# Patient Record
(demographics unavailable — no encounter records)

---

## 2024-11-18 NOTE — REASON FOR VISIT
[Patient preference] : as per patient preference [Continuing, patient seen in-person within last 12 months] : Telehealth services are continuing as patient has been seen in-person within last 12 months. [Telehealth (audio & video) - Individual/Group] : This visit was provided via telehealth using real-time 2-way audio visual technology. [Medical Office: ([facility])___] : The provider was located at the medical office in [unfilled]. [Home] : The patient, [unfilled], was located at home, [unfilled], at the time of the visit. [Participant(s) identity verified] : Participant(s) identity verified. [Verbal consent obtained from patient/other participant(s)] : Verbal consent for telehealth/telephonic services obtained from patient/other participant(s) [Patient] : Patient [FreeTextEntry4] : 11am

## 2024-11-18 NOTE — PLAN
[FreeTextEntry5] : Pt was previously seeing NP Brenda Mckinley since 4/21/2022, then transferred to Dr. Carlson 10/27/2022, now is transferring to writer. 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  On initial assessment, the differential includes MDD, anxiety, MJ use, r/o PTSD, r/o MJ induced mood disorder. Plan to continue Lexapro 30mg daily, Buspar 20mg twice a day, Klonopin 0.5mg as needed (taking 1-2 times a month lately) since pt has been stable on this regimen lately. Pt expressed interest in starting CBT, writer offered pt the [facility] CBT practice phone number [phone] to call to setup an intake appt.  On follow up assessments: 11/18/2024: Discussed that Lexapro 20mg is the FDA approved limit for Lexapro. Anxiety is under control. Plan to continue Lexapro 30mg daily, Buspar 20mg twice a day, Klonopin 0.5mg as needed for anxiety (has not needed during the interval).  Labs: 12/15/2023: CBC wnl, CMP wnl, A1c 5.3% wnl, TSH 1.58 wnl, vit B12 392 wnl, chol 195 wnl, trig 131 wnl, HDL 62 wnl, LDL calc 110 H, vit D 25 hydroxy 51.6 wnl  PLAN -Discussed the diagnosis, treatment, alternatives to recommended treatment, risk Vs benefits of treatment and no treatment and alternative treatments. -Lab/other tests: advised to continue to check annual bloodwork with PCP. -Medication: continue Lexapro 30mg daily, Buspar 20mg twice a day, Klonopin 0.5mg as needed  SSRI side effects including but not limited to GI side effects, headaches, dizziness, serotonin syndrome, hyponatremia, QT prolongation, weight gain, decreased libido, and black box warning of SI in patients younger than 24 were discussed.  Buspirone side effects were discussed including but not limited to dizziness, headaches, nervousness, sedation, excitement, nausea, restlessness, rare cardiac symptoms. Benzodiazepine side effects: Discussed with patient adverse effects of longer term/frequent use of BZD, potential to develop tolerance to the dose effects and develop dependence, and potential for addiction. Advise patient not to drive or operate heavy machinery immediately after taking the medication. Also educated patient about safe use/and keeping meds safely, and to not share medications with family/friends. Advised not to combine BZD with EtOH. Discussed potential side effects such as sedation, fatigue, depression, dizziness, ataxia, slurred speech, weakness, forgetfulness, confusion, hyperexcitability, nervousness, rare hallucinations, rare thony, rare hypotension, hypersalivation, dry mouth, respiratory depression, rare hepatic dysfunction or blood dyscrasias, grand mal seizures. -Discussed recommendation for aerobic exercise -Discussed sleep hygiene -Educated patient of importance of remaining abstinent from drugs and alcohol. -Emergency procedures were discussed: pt. educated to call 911 or go to nearest ER for worsening of symptoms/suicidal/homicidal ideation. -Pt expressed interest in starting CBT, writer offered pt the [facility] CBT practice phone number [phone] to call to setup an intake appt. -Return to clinic in Feb 2025 or earlier as needed. Discussed that writer will be out of office on a leave starting 12/5/2024 and returning 2/13/2025, and advised pt that pt can contact writer's  for covering providers during this period as needed. -Patient given opportunity to ask questions and their questions were answered and they expressed understanding and agreement with above plan.  Reference #: [id number]  Practitioner Count: 1 Pharmacy Count: 1 Current Opioid Prescriptions: 0 Current Benzodiazepine Prescriptions: 0 Current Stimulant Prescriptions: 0   Patient Demographic Information (PDI)     PDI	First Name	Last Name	Birth Date	Gender	Street Address	[facility]	Zip Code [place]	04/05/1992	Female	[address] [place]	[id number]  Prescription Information    PDI Filter:   PDI	My Rx	Current Rx	Drug Type	Rx Written	Rx Dispensed	Drug	Quantity	Days Supply	Prescriber Name	Prescriber NAVDEEP #	Payment Method	Dispenser A	Y	N	B	09/09/2024	09/12/2024	clonazepam 0.5 mg tablet	30	30	Bucky Ellis	[id number]	Insurance	[facility] #[id number]

## 2024-11-18 NOTE — HISTORY OF PRESENT ILLNESS
[FreeTextEntry1] : Social: Has been doing [employer] workouts. Getting next October 2025. Meditating. ISTOP reviewed.  Medications: Lexapro 30mg: endorses adherence, denies side effects Buspar 20mg twice a day:  endorses adherence, denies side effects Klonopin 0.5mg as needed: has not taken during the interval  Mood: "good" Anxiety: under control Panic attacks: denies Motivation: good Anhedonia: denies Appetite: good Sleep: good Energy: good Focus: intact Guilt/worthlessness: none elicited Thoughts of death: none elicited Thoughts of harming self: none elicited Thoughts of harming others: none elicited  EtOH use: socially, on weekends Drug use: weekend MJ use Tobacco use: denies Caffeine use: 1/2 a cup of coffee in the AM.

## 2024-11-18 NOTE — PHYSICAL EXAM
[None] : none [Cooperative] : cooperative [Euthymic] : euthymic [Full] : full [Clear] : clear [Linear/Goal Directed] : linear/goal directed [Average] : average [WNL] : within normal limits [FreeTextEntry8] : "good" [de-identified] : euthymic, mildly anxious, stable [FreeTextEntry7] : denies suicidal ideation/intent/plan or non-suicidal self-injurious ideation/intent/plan or homicidal ideation/intent/plan

## 2024-11-18 NOTE — PHYSICAL EXAM
[None] : none [Cooperative] : cooperative [Euthymic] : euthymic [Full] : full [Clear] : clear [Linear/Goal Directed] : linear/goal directed [Average] : average [WNL] : within normal limits [FreeTextEntry8] : "good" [de-identified] : euthymic, mildly anxious, stable [FreeTextEntry7] : denies suicidal ideation/intent/plan or non-suicidal self-injurious ideation/intent/plan or homicidal ideation/intent/plan

## 2024-11-18 NOTE — REASON FOR VISIT
[Patient preference] : as per patient preference [Continuing, patient seen in-person within last 12 months] : Telehealth services are continuing as patient has been seen in-person within last 12 months. [Telehealth (audio & video) - Individual/Group] : This visit was provided via telehealth using real-time 2-way audio visual technology. [Medical Office: ([facility])___] : The provider was located at the medical office in [unfilled]. [Home] : The patient, [unfilled], was located at home, [unfilled], at the time of the visit. [Participant(s) identity verified] : Participant(s) identity verified. [Verbal consent obtained from patient/other participant(s)] : Verbal consent for telehealth/telephonic services obtained from patient/other participant(s) [Patient] : Patient [FreeTextEntry4] : 11am

## 2024-11-18 NOTE — PLAN
[FreeTextEntry5] : Pt was previously seeing NP Brenda Mckinley since 4/21/2022, then transferred to Dr. Carlson 10/27/2022, now is transferring to writer. 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  On initial assessment, the differential includes MDD, anxiety, MJ use, r/o PTSD, r/o MJ induced mood disorder. Plan to continue Lexapro 30mg daily, Buspar 20mg twice a day, Klonopin 0.5mg as needed (taking 1-2 times a month lately) since pt has been stable on this regimen lately. Pt expressed interest in starting CBT, writer offered pt the [facility] CBT practice phone number [phone] to call to setup an intake appt.  On follow up assessments: 11/18/2024: Discussed that Lexapro 20mg is the FDA approved limit for Lexapro. Anxiety is under control. Plan to continue Lexapro 30mg daily, Buspar 20mg twice a day, Klonopin 0.5mg as needed for anxiety (has not needed during the interval).  Labs: 12/15/2023: CBC wnl, CMP wnl, A1c 5.3% wnl, TSH 1.58 wnl, vit B12 392 wnl, chol 195 wnl, trig 131 wnl, HDL 62 wnl, LDL calc 110 H, vit D 25 hydroxy 51.6 wnl  PLAN -Discussed the diagnosis, treatment, alternatives to recommended treatment, risk Vs benefits of treatment and no treatment and alternative treatments. -Lab/other tests: advised to continue to check annual bloodwork with PCP. -Medication: continue Lexapro 30mg daily, Buspar 20mg twice a day, Klonopin 0.5mg as needed  SSRI side effects including but not limited to GI side effects, headaches, dizziness, serotonin syndrome, hyponatremia, QT prolongation, weight gain, decreased libido, and black box warning of SI in patients younger than 24 were discussed.  Buspirone side effects were discussed including but not limited to dizziness, headaches, nervousness, sedation, excitement, nausea, restlessness, rare cardiac symptoms. Benzodiazepine side effects: Discussed with patient adverse effects of longer term/frequent use of BZD, potential to develop tolerance to the dose effects and develop dependence, and potential for addiction. Advise patient not to drive or operate heavy machinery immediately after taking the medication. Also educated patient about safe use/and keeping meds safely, and to not share medications with family/friends. Advised not to combine BZD with EtOH. Discussed potential side effects such as sedation, fatigue, depression, dizziness, ataxia, slurred speech, weakness, forgetfulness, confusion, hyperexcitability, nervousness, rare hallucinations, rare thony, rare hypotension, hypersalivation, dry mouth, respiratory depression, rare hepatic dysfunction or blood dyscrasias, grand mal seizures. -Discussed recommendation for aerobic exercise -Discussed sleep hygiene -Educated patient of importance of remaining abstinent from drugs and alcohol. -Emergency procedures were discussed: pt. educated to call 911 or go to nearest ER for worsening of symptoms/suicidal/homicidal ideation. -Pt expressed interest in starting CBT, writer offered pt the [facility] CBT practice phone number [phone] to call to setup an intake appt. -Return to clinic in Feb 2025 or earlier as needed. Discussed that writer will be out of office on a leave starting 12/5/2024 and returning 2/13/2025, and advised pt that pt can contact writer's  for covering providers during this period as needed. -Patient given opportunity to ask questions and their questions were answered and they expressed understanding and agreement with above plan.  Reference #: [id number]  Practitioner Count: 1 Pharmacy Count: 1 Current Opioid Prescriptions: 0 Current Benzodiazepine Prescriptions: 0 Current Stimulant Prescriptions: 0   Patient Demographic Information (PDI)     PDI	First Name	Last Name	Birth Date	Gender	Street Address	[facility]	Zip Code [place]	04/05/1992	Female	[address] [place]	[id number]  Prescription Information    PDI Filter:   PDI	My Rx	Current Rx	Drug Type	Rx Written	Rx Dispensed	Drug	Quantity	Days Supply	Prescriber Name	Prescriber NAVDEEP #	Payment Method	Dispenser A	Y	N	B	09/09/2024	09/12/2024	clonazepam 0.5 mg tablet	30	30	Bucky Ellis	[id number]	Insurance	[facility] #[id number]

## 2024-12-02 NOTE — PHYSICAL EXAM
[FreeTextEntry3] : General: well appearing person in nad, alert, pleasant Focused Skin Exam per patient preference: Eyelids, A/c fossa, and dorsal hands with eczematous thin plaques

## 2024-12-02 NOTE — ASSESSMENT
[FreeTextEntry1] : #Atopic dermatitis/eczematous derm, eyelids, arms, hands chronic, flare - Education and counseling - Gentle skin care reviewed. Emphasized to use gentle, fragrance-free personal care products (including soap and laundry detergent); Avoid scrubbing/rubbing skin - Start mometasone ointment BID to affected areas on hands/arms for 2 weeks on/1 week off. Proper medication use and side effects discussed, including cutaneous atrophy and striae. Avoid long-term use; do not use on face, axillae, groin. - start tacrolimus 0.1% to AA BID, SED including burning, which usually subsides after initial use (within 5 days, apply vaseline first), avoid applying to any open areas. - Madison use of bland emollients.   RTC 3 months or sooner prn  This visit was conducted via live synchronous audio/video telehealth with the patient and provider. The patient attested to being located in [facility] where the provider is also currently located and licensed to practice medicine. Verbal consent was obtained for this telemedicine encounter. Risks and benefits of using Telehealth services has been discussed with the patient. The patient has been given ample opportunity to discuss any questions regarding [facility]'s Telehealth services. All of the patient's questions were answered to his/her satisfaction.

## 2024-12-02 NOTE — HISTORY OF PRESENT ILLNESS
[FreeTextEntry1] : NPV- eczema [de-identified] : Dec  2 2024  1:15PM   32 year F new patient here for evaluation of eczema on eyelids, arms, hands. Has had eczema for many years, improved in adulthood. Usually controlled by otc HC. Flares in fall/weather change. Rrecently got a medrol dose pack which cleared everything but then it flared. Now using dove soap and LRP cicaplast.   All: NKDA No personal or family hx of skin cancer

## 2025-03-03 NOTE — HISTORY OF PRESENT ILLNESS
[FreeTextEntry1] : This is a case of a 32-year-old right-handed female with a past medical history of hypertension, anxiety, IBS who had been under the care of Dr. Lambert.  Patient reports having headaches since the onset of menses (puberty) notes, by diary, headaches triggered by drinking alcohol 1 cocktail per night and dehydration.  She also notes grinding her teeth and is receiving Botox for her masseter muscle spasms.  Currently, she drinks 1 cocktail on the weekend and is doing better.  She had taken Ubrelvy 1 dose per 24 hours with good relief but the headache returned.  Otherwise, she has not been on any preventative or other abortive agent.  Headaches are described as a bilateral throbbing sensation associated with tenderness of the scalp worsened by massage.  There is associated nausea with occasional vomiting, positive for photophobia but negative for phonophobia and aura.  She denies cognitive phobia.  She has had an MRI of the brain which was reported to be unremarkable.  Social history she is planning to get  in October 2025.  She is looking to get pregnant soon after the marriage. Denies tobacco use.  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 Allergies: None known by patient.  Family history positive for migraines.

## 2025-03-03 NOTE — PHYSICAL EXAM
[FreeTextEntry1] : Constitutional: No signs of distress or signs of toxicity. Mental Status: Alert and well oriented. Speech fluent. No aphasia. Fund of knowledge intact. Psychiatric: Mood stable Cranial Nerve: PERRLA: No papilledema; No VFC; EOM full. no nystagmus. No Silver. V1-3 intact. No facial asymmetry, hearing grossly intact; palate elevates symmetrically, tongue midline Motor: No involuntary movements noted.  Adequate bulk, tone throughout, 5/5 strength of all muscle groups DTR: present and symmetrical; no clonus, plantars  downgoing Sensory: intact to primary and secondary modalities; neg Romberg Cerebellar: adequate finger to nose and heel to shin bilaterally. Gait: non antalgic or ataxic. Eyes: no redness or swelling HEENT: intact; no signs of trauma. Neck: No masses noted Pulmonary: no respiratory distress Vascular: no temperature, color change or sudomotor changes.; no edema Musculoskeletal: examination of the cervical spine reveals no midline tenderness, range of motion full upon flexion, extension and lateral rotation.  Negative TMJ tenderness or crepitation bilaterally. Skin: No rash.

## 2025-05-05 NOTE — PHYSICAL EXAM
[de-identified] : Right foot Physical Examination:   General: Alert and oriented x3.  In no acute distress.  Pleasant in nature with a normal affect.  No apparent respiratory distress. Erythema, Warmth, Rubor: Negative Swelling: Negative  +3/4 cm to 1 cm transverse laceration of 1st metatarsal. +tendon extension   ROM Ankle: 1. Dorsiflexion: 10 degrees 2. Plantarflexion: 40 degrees 3. Inversion: 20 degrees 4. Eversion: 20 degrees   ROM of digits: Normal Pes Planus: Negative Pes Cavus: Negative   Bunion: Negative Tailor's Bunion (Bunionette): Negative Hammer Toe Deformity/Deformities: Negative   Tenderness to Palpation: 1. Heel Pain: Negative 2. Midfoot Pain: Negative 3. First MTP Joint: Negative 4. Lis Franc Joint: Negative 5. Lateral Malleolus: Negative 6. Medial Malleolus: Negative   Tenderness Metatarsals: 1st MT: + 2nd MT: Negative 3rd MT: Negative 4th MT: Negative 5th MT: Negative Base of the 5th MT: Negative   Tendon Pain: 1. Posterior Tibialis: Negative 2. Peroneus Brevis/Longus: Negative   Ligament Pain: 1. Lis Franc Ligament: Negative 2. Plantar Fascia Ligament: Negative 3. ATFL/CFL/PTFL: Negative 4. Deltoid Ligaments: Negative   Stability: 1. Anterior Drawer: Negative 2. Posterior Drawer: Negative   Strength: 5/5 TA/GS/EHL/FHL/EDL/ADD/ABD   Pulses: 2+ DP/PT Pulses   Capillary Refill Toes: <2 seconds   Neuro: Intact motor and sensory throughout   Additional Test: 1. Ross's Squeeze Test: Negative 2. Calcaneal Squeeze Test: Negative 3. Alvarado Test: Negative 4. Tarsal Tunnel Tinel's Sign (Posterior Tibial Nerve Impingement): Negative 5. Single Heel Rise: Negative  [de-identified] : Radiology imaging reviewed in office with the patient on 05/05/2025 and I agree with the radiologist's impression below.  Procedure was performed at the [facility]  EXAM: FOOT MIN 3 VWS RIGHT   PROCEDURE DATE: 05/02/2025   INTERPRETATION: XR FOOT COMPLETE 3 VIEWS RIGHT  HISTORY: Pain. Injury. Concern for 1st metatarsal head fracture..  VIEWS: 3 IMAGES: 3  COMPARISON: None.  FINDINGS:  OSSEOUS STRUCTURES Fractures: Nondisplaced transverse fracture of the 1st metatarsal neck.  JOINTS Joint Space(s): Maintained.  OTHER FINDINGS: N/A  IMPRESSION: 1. Nondisplaced 1st metatarsal neck fracture.  --- End of Report ---       JESSIKA ABRAHAM MD; Attending Radiologist This document has been electronically signed. May 2 2025 12:00PM

## 2025-05-05 NOTE — ADDENDUM
[FreeTextEntry1] : I, Brandt Jose, acted solely as a scribe for Dr. Dash Marmolejo on this date 05/05/2025.   All medical record entries made by the Scribe were at my, Dr. Dash Marmolejo, direction and personally dictated by me on 05/05/2025. I have reviewed the chart and agree that the record accurately reflects my personal performance of the history, physical exam, assessment and plan. I have also personally directed, reviewed, and agreed with the chart.

## 2025-05-05 NOTE — DISCUSSION/SUMMARY
[de-identified] : Today I had a lengthy discussion with the patient regarding their right foot fracture pain. I have addressed all the patient's concerns surrounding the pathology of their condition. I have reviewed the patient's XR imaging with them in great detail.  I would like the patient to proceed with conservative management, which was discussed in great detail.  Assessment: Nondisplaced 1st metatarsal neck fracture and laceration    Plan:  1. I recommend that the patient utilize ice, NSAIDS/Tylenol PRN, and heat. They can also elevate their RLE above the level of the heart. 2. Activity modification was discussed in great detail. Remain NWB in the CAM boot and crutches. 3. I recommend that the patient take Keflex antibiotics after she finishes her doxycycline script. A prescription was provided in the office today. The patient was instructed on local wound care. I recommend that the patient keep her wound dry and open to air.   The patient understood and verbally agreed to the treatment plan. All of their questions were answered, and they were satisfied with the visit. The patient should call the office if they have any questions or experience worsening symptoms.  Closed fracture care  f/u in 2 weeks

## 2025-05-05 NOTE — HISTORY OF PRESENT ILLNESS
[FreeTextEntry1] : 05/05/2025: DANIEL LOWRY is a 33 year old female presenting to the office with her fiance for an initial evaluation of a right foot fracture sustained on 05/02/2025. She dropped a cement table on her foot and lacerated her toe. She was wearing only socks. Radiographs at [facility] were positive for a 1st metatarsal neck fracture. She was sutured and provided a CAM boot and crutches. She is on a 2 week course doxycycline ~1.5 weeks and was not prescribed antibiotics at [facility]. She was advised to follow-up with an orthopedist. She uses an iWalk and crutches. She has been washing her toe. 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 The patient presents to the office NWB wearing a CAM boot and ambulating with crutches.

## 2025-05-19 NOTE — ADDENDUM
[FreeTextEntry1] : I, Brandt Jose, acted solely as a scribe for Dr. Dash Marmolejo on this date 05/19/2025.   All medical record entries made by the Scribe were at my, Dr. Dash Marmolejo, direction and personally dictated by me on 05/19/2025. I have reviewed the chart and agree that the record accurately reflects my personal performance of the history, physical exam, assessment and plan. I have also personally directed, reviewed, and agreed with the chart.

## 2025-05-19 NOTE — PHYSICAL EXAM
[de-identified] : Right foot Physical Examination:   General: Alert and oriented x3.  In no acute distress.  Pleasant in nature with a normal affect.  No apparent respiratory distress. Erythema, Warmth, Rubor: Negative Swelling: Negative  +3/4 cm to 1 cm transverse laceration of 1st metatarsal. Healing scar +tendon extension +no signs of infection   ROM Ankle: 1. Dorsiflexion: 10 degrees 2. Plantarflexion: 40 degrees 3. Inversion: 20 degrees 4. Eversion: 20 degrees   ROM of digits: Normal Pes Planus: Negative Pes Cavus: Negative   Bunion: Negative Tailor's Bunion (Bunionette): Negative Hammer Toe Deformity/Deformities: Negative   Tenderness to Palpation: 1. Heel Pain: Negative 2. Midfoot Pain: Negative 3. First MTP Joint: Negative 4. Lis Franc Joint: Negative 5. Lateral Malleolus: Negative 6. Medial Malleolus: Negative   Tenderness Metatarsals: 1st MT: +, improved 2nd MT: Negative 3rd MT: Negative 4th MT: Negative 5th MT: Negative Base of the 5th MT: Negative   Tendon Pain: 1. Posterior Tibialis: Negative 2. Peroneus Brevis/Longus: Negative   Ligament Pain: 1. Lis Franc Ligament: Negative 2. Plantar Fascia Ligament: Negative 3. ATFL/CFL/PTFL: Negative 4. Deltoid Ligaments: Negative   Stability: 1. Anterior Drawer: Negative 2. Posterior Drawer: Negative   Strength: 5/5 TA/GS/EHL/FHL/EDL/ADD/ABD   Pulses: 2+ DP/PT Pulses   Capillary Refill Toes: <2 seconds   Neuro: Intact motor and sensory throughout   Additional Test: 1. Ross's Squeeze Test: Negative 2. Calcaneal Squeeze Test: Negative 3. Alvarado Test: Negative 4. Tarsal Tunnel Tinel's Sign (Posterior Tibial Nerve Impingement): Negative 5. Single Heel Rise: Negative  [de-identified] : 3V of the right foot were ordered, obtained and reviewed by me today, 05/19/2025, and revealed: Nondisplaced 1st metatarsal neck fracture.  Radiology imaging reviewed in office with the patient on 05/05/2025 and I agree with the radiologist's impression below.  Procedure was performed at the [facility]  EXAM: FOOT MIN 3 VWS RIGHT   PROCEDURE DATE: 05/02/2025   INTERPRETATION: XR FOOT COMPLETE 3 VIEWS RIGHT  HISTORY: Pain. Injury. Concern for 1st metatarsal head fracture..  VIEWS: 3 IMAGES: 3  COMPARISON: None.  FINDINGS:  OSSEOUS STRUCTURES Fractures: Nondisplaced transverse fracture of the 1st metatarsal neck.  JOINTS Joint Space(s): Maintained.  OTHER FINDINGS: N/A  IMPRESSION: 1. Nondisplaced 1st metatarsal neck fracture.  --- End of Report ---       JESSIKA ABRAHAM MD; Attending Radiologist This document has been electronically signed. May 2 2025 12:00PM

## 2025-05-19 NOTE — HISTORY OF PRESENT ILLNESS
[FreeTextEntry1] : 05/19/2025: DANIEL LOWRY is a 33 year old female presenting to the office for a follow up evaluation of a right foot, 1st metatarsal fracture. Her symptoms have improved since last visit. Her dermatologist extended her doxycycline 50 mg, once per day, prescription. The patient presents to the office NWB in a CAM boot and ambulating in an iWalk.  05/05/2025: DANIEL LOWRY is a 33 year old female presenting to the office with her fiance for an initial evaluation of a right foot fracture sustained on 05/02/2025. She dropped a cement table on her foot and lacerated her toe. She was wearing only socks. Radiographs at [facility] were positive for a 1st metatarsal neck fracture. She was sutured and provided a CAM boot and crutches. She is on a 2 week course doxycycline ~1.5 weeks and was not prescribed antibiotics at [facility]. She was advised to follow-up with an orthopedist. She uses an iWalk and crutches. She has been washing her toe. 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 The patient presents to the office NWB wearing a CAM boot and ambulating with crutches.

## 2025-05-22 NOTE — PROCEDURE
[FreeTextEntry3] : Procedure: Trigger Point Injections (20553)   Diagnosis: Myalgia (m79.1), muscle spams of neck (M62.838)   Complications: None   Surgeon: DONELL Rodríguez   Anesthesia: None   Procedure in detail:   After explaining the potential risks of the above stated procedure which include infection, bleeding, bruising, no change in pain, increase in pain, nerve injury, local anesthetic side effect, muscle weakness, pneumothorax, low blood pressure, low heart rate, abnormal heart rhythms (arrhythmias), the patient agreed to undergo the procedure.       Using two 5 ml syringes, 8 ml of 0.5% ropivacaine (NDC: 75092-460-70) and 2 ml of 1% lidocaine (NDC: 84291-303-15) were drawn up        Trigger points were palpated, identified using palpation guidance, and after confirmation from the patient regarding locations, the areas were cleaned with an alcohol swab. In some cases, the patient identified referred pain to areas distant from the injection site. A 30 gauge 1/2 inch needle was inserted and advanced to the belly of the affected muscles, and after gentle aspiration, 0.5 to 1 mL was injected per site. The needle was removed. Hemostasis was achieved with direct pressure.  A total of 10  ml was used.       The following muscles were injected:   Right Upper Trapezius   Left Upper Trapezius   Right and Left Sternocleidomastoid   Right and Left Splenius Capitus and Cervicus           The patient tolerated the procedure well.  The patient was instructed to call with fever, chills, increased pain, redness or swelling at the injection site, or numbness or weakness in the upper extremities. The patient was discharged home in good condition with post-procedural instructions.

## 2025-05-22 NOTE — PROCEDURE
[FreeTextEntry3] : Procedure: Trigger Point Injections (20553)   Diagnosis: Myalgia (m79.1), muscle spams of neck (M62.838)   Complications: None   Surgeon: DONELL Rodríguez   Anesthesia: None   Procedure in detail:   After explaining the potential risks of the above stated procedure which include infection, bleeding, bruising, no change in pain, increase in pain, nerve injury, local anesthetic side effect, muscle weakness, pneumothorax, low blood pressure, low heart rate, abnormal heart rhythms (arrhythmias), the patient agreed to undergo the procedure.       Using two 5 ml syringes, 8 ml of 0.5% ropivacaine (NDC: 42132-602-20) and 2 ml of 1% lidocaine (NDC: 82076-272-91) were drawn up        Trigger points were palpated, identified using palpation guidance, and after confirmation from the patient regarding locations, the areas were cleaned with an alcohol swab. In some cases, the patient identified referred pain to areas distant from the injection site. A 30 gauge 1/2 inch needle was inserted and advanced to the belly of the affected muscles, and after gentle aspiration, 0.5 to 1 mL was injected per site. The needle was removed. Hemostasis was achieved with direct pressure.  A total of 10  ml was used.       The following muscles were injected:   Right Upper Trapezius   Left Upper Trapezius   Right and Left Sternocleidomastoid   Right and Left Splenius Capitus and Cervicus           The patient tolerated the procedure well.  The patient was instructed to call with fever, chills, increased pain, redness or swelling at the injection site, or numbness or weakness in the upper extremities. The patient was discharged home in good condition with post-procedural instructions.

## 2025-05-22 NOTE — PROCEDURE
[FreeTextEntry3] : Procedure: Trigger Point Injections (20553)   Diagnosis: Myalgia (m79.1), muscle spams of neck (M62.838)   Complications: None   Surgeon: DONELL Rodríguez   Anesthesia: None   Procedure in detail:   After explaining the potential risks of the above stated procedure which include infection, bleeding, bruising, no change in pain, increase in pain, nerve injury, local anesthetic side effect, muscle weakness, pneumothorax, low blood pressure, low heart rate, abnormal heart rhythms (arrhythmias), the patient agreed to undergo the procedure.       Using two 5 ml syringes, 8 ml of 0.5% ropivacaine (NDC: 06001-938-49) and 2 ml of 1% lidocaine (NDC: 31124-808-31) were drawn up        Trigger points were palpated, identified using palpation guidance, and after confirmation from the patient regarding locations, the areas were cleaned with an alcohol swab. In some cases, the patient identified referred pain to areas distant from the injection site. A 30 gauge 1/2 inch needle was inserted and advanced to the belly of the affected muscles, and after gentle aspiration, 0.5 to 1 mL was injected per site. The needle was removed. Hemostasis was achieved with direct pressure.  A total of 10  ml was used.       The following muscles were injected:   Right Upper Trapezius   Left Upper Trapezius   Right and Left Sternocleidomastoid   Right and Left Splenius Capitus and Cervicus           The patient tolerated the procedure well.  The patient was instructed to call with fever, chills, increased pain, redness or swelling at the injection site, or numbness or weakness in the upper extremities. The patient was discharged home in good condition with post-procedural instructions.

## 2025-06-24 NOTE — HISTORY OF PRESENT ILLNESS
[FreeTextEntry1] : 6/24/25: Patient presents to clinic for follow-up.  She has started having increased frequency and her migraines, up to roughly 3 to 4 days/week.  She states that she feels a lot of her migraines start from carrying stress in her neck and shoulders which then started to become very tense.  She states she has tried rizatriptan 10 mg given at last appointment for her migraines but she notes that that she has found no relief from this.  She had been given sample of Ubrelvy in past and states that it helped tremendously.  She has not been on any prevention therapy at this point.  Denies dizziness, numbness, vision, hearing issues/tinnitus, CP, SOB, focal weakness, change in bowel, bladder habits, trauma, LOC, fever, chills. -------------------------------------------- <<< *** BACKGROUND *** >>> -------------------------------------------- This is a case of a 32-year-old right-handed female with a past medical history of hypertension, anxiety, IBS who had been under the care of Dr. Lambert.  Patient reports having headaches since the onset of menses (puberty) notes, by diary, headaches triggered by drinking alcohol 1 cocktail per night and dehydration.  She also notes grinding her teeth and is receiving Botox for her masseter muscle spasms.  Currently, she drinks 1 cocktail on the weekend and is doing better.  She had taken Ubrelvy 1 dose per 24 hours with good relief but the headache returned.  Otherwise, she has not been on any preventative or other abortive agent.  Headaches are described as a bilateral throbbing sensation associated with tenderness of the scalp worsened by massage.  There is associated nausea with occasional vomiting, positive for photophobia but negative for phonophobia and aura.  She denies cognitive phobia.  She has had an MRI of the brain which was reported to be unremarkable.  Social history she is planning to get  in October 2025.  She is looking to get pregnant soon after the marriage. Denies tobacco use.  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 Allergies: None known by patient.  Family history positive for migraines.

## 2025-06-24 NOTE — ASSESSMENT
[FreeTextEntry1] : This is a case of a 32-year-old woman with history of episodic migraines.  Patient learned that alcohol was a significant trigger along with dehydration and is subsequently changed her behavior.  Her neurologic examination is entirely nonfocal.  She has trialed rizatriptan 10 mg with minimal relief.  Will start Ubrelvy 100 mg as needed.  Advised patient she can take a second dose 2 hours after initial dose if needed.  Advised that this medication has not been studied during pregnancy and she will need to stop taking this medication when she is trying to become pregnant.  She voiced understanding.   She finds significant relief with trigger point injections.  Can continue with TPI's monthly as long as patient derives benefit.  At this time, would not start any prevention medication as patient is looking to become pregnant this fall, and she is currently already taking SSRI for anxiety and does not want to start any medication that could interact with her current regimen.  I spent 35 minutes reviewing history, medical records, clinical evaluation, management, discussion of plan.

## 2025-06-24 NOTE — PROCEDURE
[FreeTextEntry3] : Procedure: Trigger Point Injections (20553)   Diagnosis: Myalgia (m79.1), muscle spasms of neck (M62.838)   Complications: None   Surgeon: DONELL Rodríguez   Anesthesia: None   Procedure in detail:   After explaining the potential risks of the above stated procedure which include infection, bleeding, bruising, no change in pain, increase in pain, nerve injury, local anesthetic side effect, muscle weakness, pneumothorax, low blood pressure, low heart rate, abnormal heart rhythms (arrhythmias), the patient agreed to undergo the procedure.       Using two 5 ml syringes, 8 ml of 0.5% ropivacaine (NDC: 33550-449-34) and 2 ml of 1% lidocaine (NDC: 94552-315-93) were drawn up        Trigger points were palpated, identified using palpation guidance, and after confirmation from the patient regarding locations, the areas were cleaned with an alcohol swab. In some cases, the patient identified referred pain to areas distant from the injection site. A 30 gauge 1/2 inch needle was inserted and advanced to the belly of the affected muscles, and after gentle aspiration, 0.5 to 1 mL was injected per site. The needle was removed. Hemostasis was achieved with direct pressure.  A total of 9 ml was used.       The following muscles were injected:   Right Upper Trapezius   Left Upper Trapezius   Right and Left Sternocleidomastoid   Right and Left Splenius Capitus and Cervicus           The patient tolerated the procedure well.  The patient was instructed to call with fever, chills, increased pain, redness or swelling at the injection site, or numbness or weakness in the upper extremities. The patient was discharged home in good condition with post-procedural instructions.

## 2025-06-24 NOTE — PROCEDURE
[FreeTextEntry3] : Diagnosis: Myalgia (m79.1), muscle spams of neck (M62.838)  Complications: None  Surgeon: DONELL Rodríguez  Anesthesia: None  Procedure in detail:  After explaining the potential risks of the above stated procedure which include infection, bleeding, bruising, no change in pain, increase in pain, nerve injury, local anesthetic side effect, muscle weakness, pneumothorax, low blood pressure, low heart rate, abnormal heart rhythms (arrhythmias), the patient agreed to undergo the procedure.    Using two 5 ml syringes, 8 ml of 0.5% ropivacaine (NDC: 84938-587-51) and 2 ml of 1% lidocaine (NDC: 85717-574-99) were drawn up    Trigger points were palpated, identified using palpation guidance, and after confirmation from the patient regarding locations, the areas were cleaned with an alcohol swab. In some cases, the patient identified referred pain to areas distant from the injection site. A 30 gauge 1/2 inch needle was inserted and advanced to the belly of the affected muscles, and after gentle aspiration, 0.5 to 1 mL was injected per site. The needle was removed. Hemostasis was achieved with direct pressure. A total of 10 ml was used.    The following muscles were injected:  Right Upper Trapezius  Left Upper Trapezius  Right and Left Sternocleidomastoid  Right and Left Splenius Capitus and Cervicus      The patient tolerated the procedure well. The patient was instructed to call with fever, chills, increased pain, redness or swelling at the injection site, or numbness or weakness in the upper extremities. The patient was discharged home in good condition with post-procedural instructions.

## 2025-06-24 NOTE — PROCEDURE
[FreeTextEntry3] : Diagnosis: Myalgia (m79.1), muscle spams of neck (M62.838)  Complications: None  Surgeon: DONELL Rodríguez  Anesthesia: None  Procedure in detail:  After explaining the potential risks of the above stated procedure which include infection, bleeding, bruising, no change in pain, increase in pain, nerve injury, local anesthetic side effect, muscle weakness, pneumothorax, low blood pressure, low heart rate, abnormal heart rhythms (arrhythmias), the patient agreed to undergo the procedure.    Using two 5 ml syringes, 8 ml of 0.5% ropivacaine (NDC: 47978-329-28) and 2 ml of 1% lidocaine (NDC: 03138-039-85) were drawn up    Trigger points were palpated, identified using palpation guidance, and after confirmation from the patient regarding locations, the areas were cleaned with an alcohol swab. In some cases, the patient identified referred pain to areas distant from the injection site. A 30 gauge 1/2 inch needle was inserted and advanced to the belly of the affected muscles, and after gentle aspiration, 0.5 to 1 mL was injected per site. The needle was removed. Hemostasis was achieved with direct pressure. A total of 10 ml was used.    The following muscles were injected:  Right Upper Trapezius  Left Upper Trapezius  Right and Left Sternocleidomastoid  Right and Left Splenius Capitus and Cervicus      The patient tolerated the procedure well. The patient was instructed to call with fever, chills, increased pain, redness or swelling at the injection site, or numbness or weakness in the upper extremities. The patient was discharged home in good condition with post-procedural instructions.

## 2025-06-24 NOTE — PHYSICAL EXAM
[FreeTextEntry1] : Constitutional: No signs of distress or signs of toxicity. Mental Status: Alert and well oriented. Speech fluent. No aphasia. Fund of knowledge intact. Psychiatric: Mood stable Cranial Nerve: PERRLA: No papilledema; No VFC; EOM full. no nystagmus. No Silver. V1-3 intact. No facial asymmetry, hearing grossly intact; palate elevates symmetrically, tongue midline Motor: No involuntary movements noted.  Adequate bulk, tone throughout, 5/5 strength of all muscle groups DTR: present and symmetrical; no clonus, plantars  downgoing Sensory: intact to primary and secondary modalities; neg Romberg Cerebellar: adequate finger to nose and heel to shin bilaterally. Gait: non antalgic or ataxic. Eyes: no redness or swelling HEENT: intact; no signs of trauma. Neck: No masses noted Pulmonary: no respiratory distress Vascular: no temperature, color change or sudomotor changes.; no edema Musculoskeletal: examination of the cervical spine reveals no midline tenderness, range of motion full upon flexion, extension and lateral rotation.  Negative TMJ tenderness or crepitation bilaterally.  Mild bilateral SCM and trapezius muscle spasms. Skin: No rash.

## 2025-07-29 NOTE — PROCEDURE
[FreeTextEntry3] : Procedure: Trigger Point Injections (20553)  Diagnosis: Myalgia (m79.1), muscle spasms of neck (M62.838)  Complications: None  Surgeon: DONELL Rodríguez  Anesthesia: None  Procedure in detail:  After explaining the potential risks of the above stated procedure which include infection, bleeding, bruising, no change in pain, increase in pain, nerve injury, local anesthetic side effect, muscle weakness, pneumothorax, low blood pressure, low heart rate, abnormal heart rhythms (arrhythmias), the patient agreed to undergo the procedure.    Using two 5 ml syringes, 8 ml of 0.5% ropivacaine (NDC: 61992-879-82) and 2 ml of 1% lidocaine (NDC: 05101-635-44) were drawn up    Trigger points were palpated, identified using palpation guidance, and after confirmation from the patient regarding locations, the areas were cleaned with an alcohol swab. In some cases, the patient identified referred pain to areas distant from the injection site. A 30 gauge 1/2 inch needle was inserted and advanced to the belly of the affected muscles, and after gentle aspiration, 0.5 to 1 mL was injected per site. The needle was removed. Hemostasis was achieved with direct pressure. A total of 10 ml was used.    The following muscles were injected:  Right Upper Trapezius  Left Upper Trapezius  Right and Left Sternocleidomastoid  Right and Left Splenius Capitus and Cervicus      The patient tolerated the procedure well. The patient was instructed to call with fever, chills, increased pain, redness or swelling at the injection site, or numbness or weakness in the upper extremities. The patient was discharged home in good condition with post-procedural instructions.